# Patient Record
Sex: FEMALE | Race: WHITE | NOT HISPANIC OR LATINO | Employment: FULL TIME | URBAN - METROPOLITAN AREA
[De-identification: names, ages, dates, MRNs, and addresses within clinical notes are randomized per-mention and may not be internally consistent; named-entity substitution may affect disease eponyms.]

---
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
## 2025-01-30 ENCOUNTER — APPOINTMENT (OUTPATIENT)
Dept: ALLERGY | Facility: CLINIC | Age: 20
End: 2025-01-30
Payer: COMMERCIAL

## 2025-01-30 NOTE — PROGRESS NOTES
Subjective   Patient ID: Martha Mejia is a 19 y.o. female who presents for URI (X 2 weeks cough, congestion, low grade fever).    HPI     Here today for cough and congestion x 2 weeks -   Started as cough, productive of yellow sputum sputum   Got better for a few days then came back and got worse  Runny nose, sneezing   Sinus congestion gone now   Still coughing  No fever  No sore throat   She has tried dayquil   She has asthma and allergies - is not using her daily inhaler, using albuterol as needed - once daily   No wheezing   No chest pain or tightness  Some dyspnea with exertion     Current Outpatient Medications   Medication Sig Dispense Refill    albuterol (ProAir RespiClick) 90 mcg/actuation aerosol powdr breath activated inhaler Inhale 2 puffs every 4 hours if needed for wheezing. 1 each 3    albuterol 2.5 mg /3 mL (0.083 %) nebulizer solution INHALE CONTENT OF 1 VIAL UP TO 6 TIMES A DAY AS NEEDED FOR WHEEZING      budesonide (Pulmicort Flexhaler) 90 mcg/actuation inhaler Inhale 2 puffs 2 times a day.      dicyclomine (Bentyl) 20 mg tablet Take 1 tablet (20 mg) by mouth 4 times a day before meals.      ondansetron (Zofran) 8 mg tablet Take 1 tablet (8 mg) by mouth every 8 hours if needed.      raNITIdine (Zantac) 75 mg tablet Take 1 tablet (75 mg) by mouth if needed.      azithromycin (Zithromax) 250 mg tablet Take 2 tablets (500 mg) by mouth once daily for 1 day, THEN 1 tablet (250 mg) once daily for 4 days. Take 2 tabs (500 mg) by mouth today, than 1 daily for 4 days.. 6 tablet 0     No current facility-administered medications for this visit.       Review of Systems   Constitutional:  Negative for chills and fever.   HENT:  Positive for postnasal drip, rhinorrhea and sneezing. Negative for congestion, ear pain and sore throat.    Respiratory:  Positive for cough and shortness of breath. Negative for chest tightness and wheezing.    Cardiovascular:  Negative for chest pain.         Scales  reviewed    Patient Health Questionnaire-2 Score: 0 (01/31/25 1340)       Objective   BP 96/62 (BP Location: Right arm, Patient Position: Sitting, BP Cuff Size: Adult)   Pulse (!) 114   Temp 36.7 °C (98.1 °F) (Temporal)   Resp 14   Wt 50.3 kg (111 lb)   LMP 01/26/2025 (Exact Date)   SpO2 95%   BMI 20.30 kg/m²     Physical Exam    Constitutional: Well developed, well nourished, alert and in no acute distress.  Head and Face: NC/AT  Eyes: Normal external exam. Pupils equally round and reactive to light with normal accommodation and extraocular movements intact.  ENT: External inspection of ears normal, tympanic membranes visualized and normal. Nasal mucosa and turbinates swollen and erythematous, nasal discharge present. Oral mucosa moist, oropharynx clear without tonsillar exudate or erythema. +PND   Neck: Supple. No cervical lymphadenopathy   Cardiovascular: Regular rate and rhythm, normal S1 and S2, no murmurs, gallops, or rubs.   Pulmonary: No respiratory distress, lungs clear to auscultation bilaterally. No wheezes, rhonchi, rales.  Skin: Warm, well perfused, normal skin turgor and color.   Neurologic: Cranial nerves II-XII grossly intact.      Assessment/Plan     Problem List Items Addressed This Visit    None  Visit Diagnoses       Bronchitis    -  Primary    Relevant Medications    azithromycin (Zithromax) 250 mg tablet            Marifer Moe,   1/31/2025

## 2025-01-31 ENCOUNTER — OFFICE VISIT (OUTPATIENT)
Dept: PRIMARY CARE | Facility: CLINIC | Age: 20
End: 2025-01-31
Payer: COMMERCIAL

## 2025-01-31 VITALS
DIASTOLIC BLOOD PRESSURE: 62 MMHG | WEIGHT: 111 LBS | BODY MASS INDEX: 20.3 KG/M2 | RESPIRATION RATE: 14 BRPM | SYSTOLIC BLOOD PRESSURE: 96 MMHG | HEART RATE: 114 BPM | TEMPERATURE: 98.1 F | OXYGEN SATURATION: 95 %

## 2025-01-31 DIAGNOSIS — J40 BRONCHITIS: Primary | ICD-10-CM

## 2025-01-31 PROCEDURE — 99213 OFFICE O/P EST LOW 20 MIN: CPT | Performed by: FAMILY MEDICINE

## 2025-01-31 PROCEDURE — 1036F TOBACCO NON-USER: CPT | Performed by: FAMILY MEDICINE

## 2025-01-31 RX ORDER — AZITHROMYCIN 250 MG/1
TABLET, FILM COATED ORAL
Qty: 6 TABLET | Refills: 0 | Status: SHIPPED | OUTPATIENT
Start: 2025-01-31 | End: 2025-02-05

## 2025-01-31 ASSESSMENT — ENCOUNTER SYMPTOMS
COUGH: 1
SORE THROAT: 0
WHEEZING: 0
CHILLS: 0
FEVER: 0
CHEST TIGHTNESS: 0
SHORTNESS OF BREATH: 1
RHINORRHEA: 1

## 2025-01-31 ASSESSMENT — PATIENT HEALTH QUESTIONNAIRE - PHQ9
SUM OF ALL RESPONSES TO PHQ9 QUESTIONS 1 AND 2: 0
2. FEELING DOWN, DEPRESSED OR HOPELESS: NOT AT ALL
1. LITTLE INTEREST OR PLEASURE IN DOING THINGS: NOT AT ALL

## 2025-04-18 ENCOUNTER — OFFICE VISIT (OUTPATIENT)
Dept: PRIMARY CARE | Facility: CLINIC | Age: 20
End: 2025-04-18
Payer: COMMERCIAL

## 2025-04-18 ENCOUNTER — TELEPHONE (OUTPATIENT)
Dept: PRIMARY CARE | Facility: CLINIC | Age: 20
End: 2025-04-18

## 2025-04-18 ENCOUNTER — HOSPITAL ENCOUNTER (OUTPATIENT)
Dept: RADIOLOGY | Facility: CLINIC | Age: 20
Discharge: HOME | End: 2025-04-18
Payer: COMMERCIAL

## 2025-04-18 VITALS
TEMPERATURE: 98.8 F | BODY MASS INDEX: 20.05 KG/M2 | RESPIRATION RATE: 14 BRPM | SYSTOLIC BLOOD PRESSURE: 102 MMHG | DIASTOLIC BLOOD PRESSURE: 71 MMHG | OXYGEN SATURATION: 98 % | WEIGHT: 109.6 LBS | HEART RATE: 123 BPM

## 2025-04-18 DIAGNOSIS — R05.3 CHRONIC COUGH: Primary | ICD-10-CM

## 2025-04-18 DIAGNOSIS — G90.A POTS (POSTURAL ORTHOSTATIC TACHYCARDIA SYNDROME): ICD-10-CM

## 2025-04-18 DIAGNOSIS — R59.0 LYMPHADENOPATHY OF LEFT CERVICAL REGION: ICD-10-CM

## 2025-04-18 DIAGNOSIS — R00.0 TACHYCARDIA: ICD-10-CM

## 2025-04-18 DIAGNOSIS — R05.3 CHRONIC COUGH: ICD-10-CM

## 2025-04-18 DIAGNOSIS — R00.0 SINUS TACHYCARDIA: ICD-10-CM

## 2025-04-18 PROCEDURE — 1036F TOBACCO NON-USER: CPT | Performed by: FAMILY MEDICINE

## 2025-04-18 PROCEDURE — 99214 OFFICE O/P EST MOD 30 MIN: CPT | Performed by: FAMILY MEDICINE

## 2025-04-18 PROCEDURE — 93000 ELECTROCARDIOGRAM COMPLETE: CPT | Performed by: FAMILY MEDICINE

## 2025-04-18 PROCEDURE — 71046 X-RAY EXAM CHEST 2 VIEWS: CPT

## 2025-04-18 RX ORDER — BENZONATATE 100 MG/1
100 CAPSULE ORAL 3 TIMES DAILY PRN
Qty: 42 CAPSULE | Refills: 0 | Status: SHIPPED | OUTPATIENT
Start: 2025-04-18 | End: 2025-05-18

## 2025-04-18 ASSESSMENT — ENCOUNTER SYMPTOMS
FEVER: 0
PALPITATIONS: 0
RHINORRHEA: 0
FATIGUE: 1
SHORTNESS OF BREATH: 0
WHEEZING: 0
CHEST TIGHTNESS: 0
COUGH: 1
SORE THROAT: 1
CHILLS: 0

## 2025-04-18 NOTE — PROGRESS NOTES
Subjective   Patient ID: Martha Mejia is a 20 y.o. female who presents for URI (Productive cough since January, green/yellow mucous /bilat ear pain x 2 days ago, dizziness/left sided gland on neck swollen/Sore left side of mouth).    URI   Associated symptoms include congestion, coughing, ear pain and a sore throat. Pertinent negatives include no chest pain, rhinorrhea or wheezing.      I saw her in Jan for cough and congestion - was treated with azithromycin.    Got better.  Then got an ear infection in March and was on amoxicillin.  Took the course, helped, but symptoms quickly came back a few days later.   No fever now.    Mild nasal congestion since yesterday.   Neck glands feel swollen on left.    Hx mono twice as a child- last in middle school.    Mild sore throat.   Ears feel full.   Cough since end of Feb.      She has asthma and allergies - is using Advair daily only once a week, using albuterol as needed - few times per week. Follows with Dr. Solis. Hasn't used albuterol today.    Denies dyspnea or chest tightness or wheezing.  Takes zyrtec and flonase for allergies.    No heartburn.    She has POTS.      Dentist found a sore in her mouth today- thought might be yeast but decided to treat with a steroid rinse first.  She denies pain and can't feel it.  Left inner cheek.      Current Medications[1]    Review of Systems   Constitutional:  Positive for fatigue. Negative for chills and fever.   HENT:  Positive for congestion, ear pain, postnasal drip and sore throat. Negative for rhinorrhea.    Respiratory:  Positive for cough. Negative for chest tightness, shortness of breath and wheezing.    Cardiovascular:  Negative for chest pain and palpitations.       Objective   /71 (BP Location: Right arm, Patient Position: Sitting, BP Cuff Size: Adult)   Pulse (!) 123   Temp 37.1 °C (98.8 °F) (Temporal)   Resp 14   Wt 49.7 kg (109 lb 9.6 oz)   LMP 03/18/2025 (Exact Date)   SpO2 98%   BMI 20.05 kg/m²      Physical Exam    Constitutional: Well developed, well nourished, alert and in no acute distress.  Head and Face: NC/AT  Eyes: Normal external exam. Pupils equally round and reactive to light with normal accommodation and extraocular movements intact.  ENT: External inspection of ears normal, tympanic membranes visualized and normal. Nasal mucosa and turbinates normal, no nasal discharge present. Oral mucosa moist, oropharynx clear without exudate or erythema. Left buccal mucosa with erythematous whitish lesion.    Neck: Supple. No cervical lymphadenopathy   Cardiovascular: Regular rate and rhythm, normal S1 and S2, no murmurs, gallops, or rubs.   Pulmonary: No respiratory distress, lungs clear to auscultation bilaterally. No wheezes, rhonchi, rales.  Skin: Warm, well perfused, normal skin turgor and color.   Neurologic: Cranial nerves II-XII grossly intact.      Assessment/Plan     Problem List Items Addressed This Visit    None  Visit Diagnoses         Chronic cough    -  Primary    Relevant Medications    benzonatate (Tessalon) 100 mg capsule    Other Relevant Orders    XR chest 2 views    Bordetella Pertussis/Parapertussis PCR    Comprehensive Metabolic Panel    Referral to ENT    ERIC with Reflex to TIMOT[facility]      Tachycardia        Relevant Orders    ECG 12 lead (Clinic Performed) (Completed)      POTS (postural orthostatic tachycardia syndrome)          Lymphadenopathy of left cervical region        Relevant Orders    Prudence-Barr virus VCA antibody panel    Mononucleosis screen    CBC and Auto Differential    Referral to ENT    ERIC with Reflex to TIMOTHY      Sinus tachycardia        Relevant Orders    Referral to Cardiology          Labs and chest xray ordered   EKG today shows sinus tachycardia.  Refer cardiology.    Follow up with Dr. Solis  Refer ENT   Trial tessalon for cough, Delsym or Robitussin can also be used  Restart Advair once daily- rinse mouth after use.   If oral lesion doesn't resolve with  steroid rinse, follow up with dentist for further evaluation.      Marifer Moe DO  4/18/2025         [1]   Current Outpatient Medications   Medication Sig Dispense Refill    albuterol (ProAir RespiClick) 90 mcg/actuation aerosol powdr breath activated inhaler Inhale 2 puffs every 4 hours if needed for wheezing. 1 each 3    albuterol 2.5 mg /3 mL (0.083 %) nebulizer solution INHALE CONTENT OF 1 VIAL UP TO 6 TIMES A DAY AS NEEDED FOR WHEEZING      budesonide (Pulmicort Flexhaler) 90 mcg/actuation inhaler Inhale 2 puffs 2 times a day.      dicyclomine (Bentyl) 20 mg tablet Take 1 tablet (20 mg) by mouth 4 times a day before meals.      ondansetron (Zofran) 8 mg tablet Take 1 tablet (8 mg) by mouth every 8 hours if needed.      benzonatate (Tessalon) 100 mg capsule Take 1 capsule (100 mg) by mouth 3 times a day as needed for cough. Do not crush or chew. 42 capsule 0     No current facility-administered medications for this visit.

## 2025-04-18 NOTE — LETTER
April 18, 2025     Patient: Martha Mejia   YOB: 2005   Date of Visit: 4/18/2025       To Whom It May Concern:    Martha Mejia was seen in my clinic on 4/18/2025 at 11:30 am. Please excuse Martha for her absence from school on this day to make the appointment.    If you have any questions or concerns, please don't hesitate to call.         Sincerely,         Marifer Moe, DO Joan Lambert, PCNA

## 2025-04-18 NOTE — PATIENT INSTRUCTIONS
Labs and chest xray ordered   EKG   Follow up with Dr. Soils  Refer ENT   Trial tessalon for cough, Delsym or Robitussin can also be used  Restart Advair once daily- rinse mouth after use.   If oral lesion doesn't resolve with steroid rinse, follow up with dentist for further evaluation.

## 2025-04-18 NOTE — TELEPHONE ENCOUNTER
Patient called in and asked if she could get a doctor excuse for school.     Please and thank you.

## 2025-04-19 LAB
ALBUMIN SERPL-MCNC: 5.1 G/DL (ref 3.6–5.1)
ALP SERPL-CCNC: 35 U/L (ref 31–125)
ALT SERPL-CCNC: 11 U/L (ref 6–29)
ANA SER QL IF: NORMAL
ANION GAP SERPL CALCULATED.4IONS-SCNC: 9 MMOL/L (CALC) (ref 7–17)
AST SERPL-CCNC: 15 U/L (ref 10–30)
BASOPHILS # BLD AUTO: 50 CELLS/UL (ref 0–200)
BASOPHILS NFR BLD AUTO: 0.7 %
BILIRUB SERPL-MCNC: 2.6 MG/DL (ref 0.2–1.2)
BUN SERPL-MCNC: 12 MG/DL (ref 7–25)
CALCIUM SERPL-MCNC: 9.4 MG/DL (ref 8.6–10.2)
CHLORIDE SERPL-SCNC: 105 MMOL/L (ref 98–110)
CO2 SERPL-SCNC: 25 MMOL/L (ref 20–32)
CREAT SERPL-MCNC: 0.74 MG/DL (ref 0.5–0.96)
EBV NA IGG SER IA-ACNC: NORMAL [IU]/ML
EBV VCA IGG SER IA-ACNC: NORMAL
EBV VCA IGM SER IA-ACNC: NORMAL
EGFRCR SERPLBLD CKD-EPI 2021: 119 ML/MIN/1.73M2
EOSINOPHIL # BLD AUTO: 108 CELLS/UL (ref 15–500)
EOSINOPHIL NFR BLD AUTO: 1.5 %
ERYTHROCYTE [DISTWIDTH] IN BLOOD BY AUTOMATED COUNT: 11.8 % (ref 11–15)
GLUCOSE SERPL-MCNC: 81 MG/DL (ref 65–99)
HCT VFR BLD AUTO: 43.4 % (ref 35–45)
HETEROPH AB SER QL LA: NORMAL
HGB BLD-MCNC: 14.5 G/DL (ref 11.7–15.5)
LYMPHOCYTES # BLD AUTO: 1598 CELLS/UL (ref 850–3900)
LYMPHOCYTES NFR BLD AUTO: 22.2 %
MCH RBC QN AUTO: 31.4 PG (ref 27–33)
MCHC RBC AUTO-ENTMCNC: 33.4 G/DL (ref 32–36)
MCV RBC AUTO: 93.9 FL (ref 80–100)
MONOCYTES # BLD AUTO: 461 CELLS/UL (ref 200–950)
MONOCYTES NFR BLD AUTO: 6.4 %
NEUTROPHILS # BLD AUTO: 4982 CELLS/UL (ref 1500–7800)
NEUTROPHILS NFR BLD AUTO: 69.2 %
PLATELET # BLD AUTO: 219 THOUSAND/UL (ref 140–400)
PMV BLD REES-ECKER: 9.9 FL (ref 7.5–12.5)
POTASSIUM SERPL-SCNC: 3.9 MMOL/L (ref 3.5–5.3)
PROT SERPL-MCNC: 7.5 G/DL (ref 6.1–8.1)
QUEST EBV PANEL INTERPRETATION:: NORMAL
RBC # BLD AUTO: 4.62 MILLION/UL (ref 3.8–5.1)
SODIUM SERPL-SCNC: 139 MMOL/L (ref 135–146)
WBC # BLD AUTO: 7.2 THOUSAND/UL (ref 3.8–10.8)

## 2025-04-21 DIAGNOSIS — J40 BRONCHITIS: Primary | ICD-10-CM

## 2025-04-21 RX ORDER — PREDNISONE 10 MG/1
TABLET ORAL
Qty: 21 TABLET | Refills: 0 | Status: SHIPPED | OUTPATIENT
Start: 2025-04-21 | End: 2025-04-27

## 2025-04-21 NOTE — TELEPHONE ENCOUNTER
Pt mother called in wanting to know if pt could receive a round of steroids for the chronic cough pt has been dealing with states the oral medication prescribed has not helped and also wanted to know if the doctors excuse note could be sent over in MyChart pt states she does not see the letter written

## 2025-04-22 DIAGNOSIS — R59.0 LYMPHADENOPATHY OF LEFT CERVICAL REGION: ICD-10-CM

## 2025-04-22 DIAGNOSIS — R76.8 POSITIVE ANA (ANTINUCLEAR ANTIBODY): Primary | ICD-10-CM

## 2025-04-22 DIAGNOSIS — R05.3 CHRONIC COUGH: ICD-10-CM

## 2025-04-22 LAB
ALBUMIN SERPL-MCNC: 5.1 G/DL (ref 3.6–5.1)
ALP SERPL-CCNC: 35 U/L (ref 31–125)
ALT SERPL-CCNC: 11 U/L (ref 6–29)
ANA PAT SER IF-IMP: ABNORMAL
ANA SER QL IF: POSITIVE
ANA TITR SER IF: ABNORMAL TITER
ANION GAP SERPL CALCULATED.4IONS-SCNC: 9 MMOL/L (CALC) (ref 7–17)
AST SERPL-CCNC: 15 U/L (ref 10–30)
B PARAPERT DNA SPEC QL NAA+PROBE: NOT DETECTED
B PERT DNA SPEC QL NAA+PROBE: NOT DETECTED
BASOPHILS # BLD AUTO: 50 CELLS/UL (ref 0–200)
BASOPHILS NFR BLD AUTO: 0.7 %
BILIRUB SERPL-MCNC: 2.6 MG/DL (ref 0.2–1.2)
BUN SERPL-MCNC: 12 MG/DL (ref 7–25)
CALCIUM SERPL-MCNC: 9.4 MG/DL (ref 8.6–10.2)
CENTROMERE B AB SER-ACNC: ABNORMAL AI
CHLORIDE SERPL-SCNC: 105 MMOL/L (ref 98–110)
CO2 SERPL-SCNC: 25 MMOL/L (ref 20–32)
CREAT SERPL-MCNC: 0.74 MG/DL (ref 0.5–0.96)
DSDNA AB SER-ACNC: <1 IU/ML
EBV NA IGG SER IA-ACNC: >600 U/ML
EBV VCA IGG SER IA-ACNC: 159 U/ML
EBV VCA IGM SER IA-ACNC: <36 U/ML
EGFRCR SERPLBLD CKD-EPI 2021: 119 ML/MIN/1.73M2
ENA JO1 AB SER IA-ACNC: ABNORMAL AI
ENA RNP AB SER-ACNC: ABNORMAL AI
ENA SCL70 AB SER IA-ACNC: ABNORMAL AI
ENA SM AB SER IA-ACNC: ABNORMAL AI
ENA SM+RNP AB SER IA-ACNC: ABNORMAL AI
ENA SS-A AB SER IA-ACNC: ABNORMAL AI
ENA SS-B AB SER IA-ACNC: ABNORMAL AI
EOSINOPHIL # BLD AUTO: 108 CELLS/UL (ref 15–500)
EOSINOPHIL NFR BLD AUTO: 1.5 %
ERYTHROCYTE [DISTWIDTH] IN BLOOD BY AUTOMATED COUNT: 11.8 % (ref 11–15)
GLUCOSE SERPL-MCNC: 81 MG/DL (ref 65–99)
HCT VFR BLD AUTO: 43.4 % (ref 35–45)
HETEROPH AB SER QL LA: NEGATIVE
HGB BLD-MCNC: 14.5 G/DL (ref 11.7–15.5)
LABORATORY COMMENT REPORT: ABNORMAL
LYMPHOCYTES # BLD AUTO: 1598 CELLS/UL (ref 850–3900)
LYMPHOCYTES NFR BLD AUTO: 22.2 %
MCH RBC QN AUTO: 31.4 PG (ref 27–33)
MCHC RBC AUTO-ENTMCNC: 33.4 G/DL (ref 32–36)
MCV RBC AUTO: 93.9 FL (ref 80–100)
MONOCYTES # BLD AUTO: 461 CELLS/UL (ref 200–950)
MONOCYTES NFR BLD AUTO: 6.4 %
NEUTROPHILS # BLD AUTO: 4982 CELLS/UL (ref 1500–7800)
NEUTROPHILS NFR BLD AUTO: 69.2 %
NUCLEOSOME AB SER IA-ACNC: ABNORMAL AI
PLATELET # BLD AUTO: 219 THOUSAND/UL (ref 140–400)
PMV BLD REES-ECKER: 9.9 FL (ref 7.5–12.5)
POTASSIUM SERPL-SCNC: 3.9 MMOL/L (ref 3.5–5.3)
PROT SERPL-MCNC: 7.5 G/DL (ref 6.1–8.1)
QUEST EBV PANEL INTERPRETATION:: ABNORMAL
RBC # BLD AUTO: 4.62 MILLION/UL (ref 3.8–5.1)
RIBOSOMAL P AB SER-ACNC: ABNORMAL AI
SODIUM SERPL-SCNC: 139 MMOL/L (ref 135–146)
SPECIMEN SOURCE: NORMAL
WBC # BLD AUTO: 7.2 THOUSAND/UL (ref 3.8–10.8)

## 2025-04-25 NOTE — TELEPHONE ENCOUNTER
Patient mother called and stated that daughter was in the er this past week stated the  had already spoken to her pcp and stated pcp said to get the daughter into the office. Daughter cannot come in until the end of may, did offer another provider due to when the mother wanted daughter to come in there were no openings with her pcp she refused to see anyone else please advise.

## 2025-04-29 NOTE — TELEPHONE ENCOUNTER
Please call the patient's Mother, I could see Martha on May 19th or 20th,  or on Friday June 6th?  Ok to take an Urgent/Acute slot,  I will not be in the office between these dates

## 2025-05-09 PROBLEM — R55 PRE-SYNCOPE: Status: ACTIVE | Noted: 2025-05-09

## 2025-05-09 PROBLEM — G43.909 MIGRAINE HEADACHE: Status: ACTIVE | Noted: 2025-05-09

## 2025-05-09 PROBLEM — J45.40 MODERATE PERSISTENT ASTHMA WITHOUT COMPLICATION (HHS-HCC): Status: ACTIVE | Noted: 2025-05-09

## 2025-05-09 PROBLEM — R53.83 FATIGUE: Status: ACTIVE | Noted: 2025-05-09

## 2025-05-09 PROBLEM — R82.71 BACTERIURIA WITH PYURIA: Status: RESOLVED | Noted: 2023-10-12 | Resolved: 2025-05-09

## 2025-05-09 PROBLEM — R10.9 ABDOMINAL PAIN: Status: RESOLVED | Noted: 2023-10-12 | Resolved: 2025-05-09

## 2025-05-09 PROBLEM — N94.6 DYSMENORRHEA: Status: ACTIVE | Noted: 2025-05-09

## 2025-05-09 PROBLEM — L73.9 FOLLICULITIS: Status: RESOLVED | Noted: 2024-08-08 | Resolved: 2025-05-09

## 2025-05-09 PROBLEM — R82.81 BACTERIURIA WITH PYURIA: Status: RESOLVED | Noted: 2023-10-12 | Resolved: 2025-05-09

## 2025-05-09 PROBLEM — R23.1 PALE SKIN: Status: ACTIVE | Noted: 2025-05-09

## 2025-05-12 DIAGNOSIS — J30.9 ALLERGIC RHINITIS, UNSPECIFIED SEASONALITY, UNSPECIFIED TRIGGER: Primary | ICD-10-CM

## 2025-05-12 RX ORDER — DEXTROMETHORPHAN HYDROBROMIDE, GUAIFENESIN AND PSEUDOEPHEDRINE HYDROCHLORIDE 15; 400; 60 MG/1; MG/1; MG/1
1 TABLET ORAL EVERY 8 HOURS PRN
Qty: 21 TABLET | Refills: 0 | Status: SHIPPED | OUTPATIENT
Start: 2025-05-12

## 2025-05-30 ENCOUNTER — APPOINTMENT (OUTPATIENT)
Dept: CARDIOLOGY | Facility: HOSPITAL | Age: 20
End: 2025-05-30
Payer: COMMERCIAL

## 2025-05-30 ENCOUNTER — OFFICE VISIT (OUTPATIENT)
Dept: CARDIOLOGY | Facility: CLINIC | Age: 20
End: 2025-05-30
Payer: COMMERCIAL

## 2025-05-30 ENCOUNTER — HOSPITAL ENCOUNTER (OUTPATIENT)
Dept: CARDIOLOGY | Facility: CLINIC | Age: 20
Discharge: HOME | End: 2025-05-30
Payer: COMMERCIAL

## 2025-05-30 VITALS
HEART RATE: 103 BPM | SYSTOLIC BLOOD PRESSURE: 100 MMHG | OXYGEN SATURATION: 98 % | WEIGHT: 114 LBS | BODY MASS INDEX: 20.98 KG/M2 | HEIGHT: 62 IN | DIASTOLIC BLOOD PRESSURE: 62 MMHG

## 2025-05-30 DIAGNOSIS — R00.0 SINUS TACHYCARDIA: ICD-10-CM

## 2025-05-30 DIAGNOSIS — R55 PRE-SYNCOPE: ICD-10-CM

## 2025-05-30 DIAGNOSIS — R53.83 OTHER FATIGUE: ICD-10-CM

## 2025-05-30 DIAGNOSIS — R00.2 PALPITATIONS: ICD-10-CM

## 2025-05-30 DIAGNOSIS — R00.0 SINUS TACHYCARDIA: Primary | ICD-10-CM

## 2025-05-30 LAB — BODY SURFACE AREA: 1.5 M2

## 2025-05-30 PROCEDURE — 93005 ELECTROCARDIOGRAM TRACING: CPT | Performed by: STUDENT IN AN ORGANIZED HEALTH CARE EDUCATION/TRAINING PROGRAM

## 2025-05-30 PROCEDURE — 99214 OFFICE O/P EST MOD 30 MIN: CPT | Performed by: STUDENT IN AN ORGANIZED HEALTH CARE EDUCATION/TRAINING PROGRAM

## 2025-05-30 PROCEDURE — 1036F TOBACCO NON-USER: CPT | Performed by: STUDENT IN AN ORGANIZED HEALTH CARE EDUCATION/TRAINING PROGRAM

## 2025-05-30 PROCEDURE — 93225 XTRNL ECG REC<48 HRS REC: CPT

## 2025-05-30 PROCEDURE — 99204 OFFICE O/P NEW MOD 45 MIN: CPT | Performed by: STUDENT IN AN ORGANIZED HEALTH CARE EDUCATION/TRAINING PROGRAM

## 2025-05-30 PROCEDURE — 3008F BODY MASS INDEX DOCD: CPT | Performed by: STUDENT IN AN ORGANIZED HEALTH CARE EDUCATION/TRAINING PROGRAM

## 2025-05-30 ASSESSMENT — ENCOUNTER SYMPTOMS
OCCASIONAL FEELINGS OF UNSTEADINESS: 0
DEPRESSION: 0
LOSS OF SENSATION IN FEET: 0

## 2025-05-30 NOTE — PROGRESS NOTES
Referred by Dr. Moe for New Patient Visit (Patient is here for a new patient consult. )     History Of Present Illness:    Martha Mejia is a 20 y.o. female past history notable for asthma as well as neurogenic syncope/vasovagal syncope who presents to establish care with us.  Patient is followed with peds cards in the past and now is here to establish care with us.  Patient has chronic tachycardia with elevated resting heart rates that are hovering around 100.  She has had progressive symptoms that are similar to POTS.  Notably her older sister has POTS and follows in our clinic.  Patient aggressively maintains hydration drinking up to 100 ounces of fluid as well as using electrolyte water as well as salt tabs.  She has had near syncope when she stands up really fast.  There is no reports of heavy ethanol consumption.  She does drink energy drinks from time to time but not daily.  She does enjoy coffee as well.  Patient is also noticed fatigue during this timeframe.  Towards beginning of this year she has been dealing with acute episode of persistent bronchitis and cough.  She has had several rounds of treatment and the going thought now is that the mold exposure may be a trigger for underlying cough.  During this time she is had increased heart rate and increased symptoms.    In 2023 patient wore a Holter monitor which did not show evidence of tacky bradycardia syndrome atrial fibrillation or VT average heart rate of 94 bpm and less than 1% of ectopic rhythm.  Echo performed at the time showed normal ejection fraction no significant valve pathology or congenital abnormality.  Previous labs reviewed.  Baseline ECG today is sinus rhythm with a heart rate of 92.      Past Medical History:  She has a past medical history of Cough, unspecified (03/13/2015), Encounter for immunization (01/25/2019), Encounter for screening for disorder due to exposure to contaminants, Other specified abnormal findings of blood  "chemistry (09/13/2019), Pain in right arm (09/13/2019), Personal history of other diseases of the female genital tract (12/02/2021), Personal history of other diseases of the female genital tract (12/19/2019), Personal history of other specified conditions (06/11/2019), Personal history of other specified conditions (08/09/2019), Plantar wart (02/16/2018), and Right lower quadrant abdominal tenderness (05/13/2019).    Past Surgical History:  She has a past surgical history that includes Tonsillectomy (02/12/2014) and Other surgical history (02/12/2014).      Social History:  She reports that she has never smoked. She has never been exposed to tobacco smoke. She has never used smokeless tobacco. She reports that she does not drink alcohol and does not use drugs.    Family History:  Family History[1]     Allergies:  Gluten, Milk, Lactase, Milk containing products (dairy), and Other    Outpatient Medications:  Current Outpatient Medications   Medication Instructions    albuterol (ProAir RespiClick) 90 mcg/actuation aerosol powdr breath activated inhaler 2 puffs, inhalation, Every 4 hours PRN    albuterol 2.5 mg /3 mL (0.083 %) nebulizer solution INHALE CONTENT OF 1 VIAL UP TO 6 TIMES A DAY AS NEEDED FOR WHEEZING    budesonide (Pulmicort Flexhaler) 90 mcg/actuation inhaler 2 puffs, 2 times daily RT    dicyclomine (BENTYL) 20 mg, 4 times daily before meals and nightly    ondansetron (ZOFRAN) 8 mg, Every 8 hours PRN    pseudoephedrine-DM-guaifenesin (Capmist DM) 60- mg tablet 1 tablet, oral, Every 8 hours PRN        Last Recorded Vitals:  Vitals:    05/30/25 0805   BP: 100/62   BP Location: Left arm   Patient Position: Sitting   Pulse: 103   SpO2: 98%   Weight: 51.7 kg (114 lb)   Height: 1.575 m (5' 2\")       Physical Exam:  General: No acute distress,  A&O x3, mother is present  Skin: Warm and dry  Neck: JVD is not elevated  ENT: Moist mucous membranes no lesions appreciated  Pulmonary: CTAB  Cards: Tachycardic but " "otherwise regular rate rhythm, no murmurs gallops or rubs appreciated normal S1-S2  Psych: Appropriate mood and affect     @PESEC->PESEC(CIRCULAR REFERENCE)@       Last Labs:  CBC -  Lab Results   Component Value Date    WBC 7.2 04/18/2025    HGB 14.5 04/18/2025    HCT 43.4 04/18/2025    MCV 93.9 04/18/2025     04/18/2025       CMP -  Lab Results   Component Value Date    CALCIUM 9.4 04/18/2025    PROT 7.5 04/18/2025    ALBUMIN 5.1 04/18/2025    AST 15 04/18/2025    ALT 11 04/18/2025    ALKPHOS 35 04/18/2025    BILITOT 2.6 (H) 04/18/2025       LIPID PANEL -   No results found for: \"CHOL\", \"TRIG\", \"HDL\", \"CHHDL\", \"LDLF\", \"VLDL\", \"NHDL\"    RENAL FUNCTION PANEL -   Lab Results   Component Value Date    GLUCOSE 81 04/18/2025     04/18/2025    K 3.9 04/18/2025     04/18/2025    CO2 25 04/18/2025    ANIONGAP 9 04/18/2025    BUN 12 04/18/2025    CREATININE 0.74 04/18/2025    CALCIUM 9.4 04/18/2025    ALBUMIN 5.1 04/18/2025        No results found for: \"BNP\", \"HGBA1C\"    Last Cardiology Tests:  ECG:  ECG 12 lead (Clinic Performed) 04/18/2025    Assessment/Plan     1.  Inappropriate sinus tachycardia versus POTS: Baseline symptoms are highly suspicious for POTS.  Suspect resting heart rate is manifestation of this phenomenon.  Previous workup including Holter monitor and echocardiogram are fairly benign.  Resting heart rate is 94 bpm.  Baseline ECG today of normal sinus rhythm with a heart rate of 92 bpm.  - Patient is dealing with chronic cough/bronchitis since the beginning of the year and has undergone several rounds of treatment.  Recent labs notable for positive ERIC  - We have ordered autonomic testing  - 48-hour Holter monitor to again reassess average heart rate  -Check iron and thyroid studies  - The patient may return to clinic once testing is completed    (This note was generated with voice recognition software and may contain errors including spelling, grammar, syntax and missed recognition of " what was dictated, of which may not have been fully corrected)     Juan R Estrella MD PhD       [1]   Family History  Problem Relation Name Age of Onset    Asthma Mother      Urticaria Mother      Allergies Mother      Chronic bronchitis Mother      Asthma Father      Eczema Father      Allergies Father      Asthma Sister      Eczema Sister      Allergies Sister      Allergic rhinitis Daughter

## 2025-05-31 LAB
FERRITIN SERPL-MCNC: 27 NG/ML (ref 16–154)
IRON SATN MFR SERPL: 25 % (CALC) (ref 16–45)
IRON SERPL-MCNC: 80 MCG/DL (ref 40–190)
TIBC SERPL-MCNC: 318 MCG/DL (CALC) (ref 250–450)
TSH SERPL-ACNC: 1.36 MIU/L

## 2025-06-02 PROBLEM — J00 ACUTE RHINITIS: Status: ACTIVE | Noted: 2023-08-21

## 2025-06-02 NOTE — PROGRESS NOTES
"  Subjective   Patient ID:   [id number]   Martha Mejia is a 20 y.o. female who presents for Follow-up (Frequent illness. Cough and fatigue.  ).    Chief Complaint   Patient presents with    Follow-up     Frequent illness. Cough and fatigue.        Patient presents for 6 month asthma F/U.  Patient  is accompanied by her mother.    Since last visit, 8-8-24, patient states her EBV panel and ERIC 4-18-25 were positive.    Patient states she had bronchitis while home for [employer].  Although she was starting to get better, she soon worsened upon returning to her dorm.  She is coughing up thick mucous, which worsens at times with excess talking.  She is also having congestion and vocal hoarseness.  She has a tickle at the base of her throat.  She also tried an oral steroid for her cough without a change in her symptoms. She had mono recently, but did not know it.  She had sporadic fevers and was on antibiotics at least 2 times.     This is the second year in this dorm, but she will not be going back.  She is going to [employer] for next semester and then moving into an apartment.     Review of Systems   HENT:  Positive for congestion and voice change (hoarseness).    Respiratory:  Positive for cough (productive of thick mucous).      Objective   Ht 1.575 m (5' 2\")   Wt 51.7 kg (114 lb)   BMI 20.85 kg/m²      Physical Exam  Constitutional:       Appearance: Normal appearance.   HENT:      Head: Normocephalic and atraumatic.      Right Ear: External ear normal. There is no impacted cerumen.      Left Ear: External ear normal. There is no impacted cerumen.      Nose: No congestion or rhinorrhea.   Eyes:      Extraocular Movements: Extraocular movements intact.      Conjunctiva/sclera: Conjunctivae normal.      Pupils: Pupils are equal, round, and reactive to light.   Cardiovascular:      Rate and Rhythm: Normal rate and regular rhythm.      Heart sounds: No murmur heard.     No friction rub. No gallop.   Pulmonary:      " Effort: No respiratory distress.      Breath sounds: No wheezing, rhonchi or rales.   Skin:     General: Skin is warm and dry.   Neurological:      Mental Status: She is alert.   Psychiatric:         Mood and Affect: Mood normal.         Behavior: Behavior normal.     Pulmonary Functions Testing Results:    FEV1   Date Value Ref Range Status   06/11/2025 97 liters Final     FVC   Date Value Ref Range Status   06/11/2025 92 liters Final     FEV1/FVC   Date Value Ref Range Status   06/11/2025 107 % Final     Assessment/Plan   Diagnoses and all orders for this visit:  Chronic infection  -     Lymphocyte Proliferation to Antigens; Future  -     Lymphocyte Proliferation to Mitogens; Future  -     Isma Binding Lectin; Future  -     QUEST MISCELLANEOUS TEST (ROOM TEMPERATURE); Future  -     Strep Pneumo IgG Ab 23 Serotypes; Future  -     Tetanus Antibody, IgG; Future  -     Immunodeficiency Profile Panel; Future  -     Immunoglobulins (IgG, IgA, IgM); Future  -     Diphtheria Antibody; Future  -     Respiratory Culture/Smear; Future  Other cough  -     Referral to ENT; Future  -     Respiratory Culture/Smear; Future  Moderate persistent asthma without complication ([facility])    Mari Solis MD

## 2025-06-02 NOTE — PATIENT INSTRUCTIONS
Sputum culture    Referral to Paradise Delatorre MD, CAREN Maxwell MD or Edu Wright, NP    Recheck immune system

## 2025-06-03 ENCOUNTER — TELEPHONE (OUTPATIENT)
Dept: CARDIOLOGY | Facility: CLINIC | Age: 20
End: 2025-06-03
Payer: COMMERCIAL

## 2025-06-03 NOTE — TELEPHONE ENCOUNTER
----- Message from Juan R Estrella sent at 6/2/2025  4:06 PM EDT -----  Notify patient of stable lab test results.     ----- Message -----  From: Margo Celiro Results In  Sent: 5/31/2025  12:20 AM EDT  To: Juan R Estrella MD PhD

## 2025-06-05 ENCOUNTER — APPOINTMENT (OUTPATIENT)
Dept: ALLERGY | Facility: CLINIC | Age: 20
End: 2025-06-05
Payer: COMMERCIAL

## 2025-06-05 VITALS — BODY MASS INDEX: 20.98 KG/M2 | HEIGHT: 62 IN | WEIGHT: 114 LBS

## 2025-06-05 DIAGNOSIS — J45.40 MODERATE PERSISTENT ASTHMA WITHOUT COMPLICATION (HHS-HCC): ICD-10-CM

## 2025-06-05 DIAGNOSIS — R05.8 OTHER COUGH: ICD-10-CM

## 2025-06-05 DIAGNOSIS — B99.9 CHRONIC INFECTION: Primary | ICD-10-CM

## 2025-06-05 PROCEDURE — 3008F BODY MASS INDEX DOCD: CPT | Performed by: ALLERGY & IMMUNOLOGY

## 2025-06-05 PROCEDURE — 94375 RESPIRATORY FLOW VOLUME LOOP: CPT | Performed by: ALLERGY & IMMUNOLOGY

## 2025-06-05 PROCEDURE — 99214 OFFICE O/P EST MOD 30 MIN: CPT | Performed by: ALLERGY & IMMUNOLOGY

## 2025-06-05 PROCEDURE — 96160 PT-FOCUSED HLTH RISK ASSMT: CPT | Performed by: ALLERGY & IMMUNOLOGY

## 2025-06-06 LAB
ATRIAL RATE: 92 BPM
P AXIS: 63 DEGREES
P OFFSET: 185 MS
P ONSET: 138 MS
PR INTERVAL: 158 MS
Q ONSET: 217 MS
QRS COUNT: 15 BEATS
QRS DURATION: 90 MS
QT INTERVAL: 334 MS
QTC CALCULATION(BAZETT): 413 MS
QTC FREDERICIA: 385 MS
R AXIS: 77 DEGREES
T AXIS: 30 DEGREES
T OFFSET: 384 MS
VENTRICULAR RATE: 92 BPM

## 2025-06-08 LAB — BODY SURFACE AREA: 1.5 M2

## 2025-06-11 PROBLEM — R05.9 COUGH: Status: ACTIVE | Noted: 2025-06-11

## 2025-06-11 PROBLEM — B99.9 CHRONIC INFECTION: Status: ACTIVE | Noted: 2025-06-11

## 2025-06-11 PROBLEM — J45.40 MODERATE PERSISTENT ASTHMA WITHOUT COMPLICATION (HHS-HCC): Status: ACTIVE | Noted: 2023-08-21

## 2025-06-11 LAB
FEV1/FVC: 107 %
FEV1: 97 LITERS
FVC: 92 LITERS

## 2025-06-11 ASSESSMENT — ENCOUNTER SYMPTOMS
VOICE CHANGE: 1
COUGH: 1

## 2025-06-11 NOTE — ASSESSMENT & PLAN NOTE
Patient developed bronchitis while at home for [employer] break and started coughing upon returning to her dorm.  She had mono recently and sporadic fevers requiring at least 2 courses of antibiotics.    Recheck immune system.

## 2025-06-11 NOTE — ASSESSMENT & PLAN NOTE
She C/O a tickle cough productive of thick sputum, vocal hoarseness and congestion after returning to her dorm.      She will provide a sputum culture.    Referral to Paradise Delatorre MD, CAREN Maxwell MD or Edu Wright NP

## 2025-06-11 NOTE — ASSESSMENT & PLAN NOTE
She is having some exacerbation due to her recurrent illness. Nonetheless prednisone is not helping her cough.    PFT looks good except jagged loop. Prednisone did not help cough.

## 2025-06-16 ENCOUNTER — LAB (OUTPATIENT)
Dept: LAB | Facility: HOSPITAL | Age: 20
End: 2025-06-16
Payer: COMMERCIAL

## 2025-06-16 LAB
BASOPHILS # BLD AUTO: 0.06 X10*3/UL (ref 0–0.1)
BASOPHILS NFR BLD AUTO: 1.3 %
EOSINOPHIL # BLD AUTO: 0.13 X10*3/UL (ref 0–0.7)
EOSINOPHIL NFR BLD AUTO: 2.7 %
ERYTHROCYTE [DISTWIDTH] IN BLOOD BY AUTOMATED COUNT: 12.3 % (ref 11.5–14.5)
HCT VFR BLD AUTO: 42.5 % (ref 36–46)
HGB BLD-MCNC: 13.8 G/DL (ref 12–16)
IMM GRANULOCYTES # BLD AUTO: 0.02 X10*3/UL (ref 0–0.7)
IMM GRANULOCYTES NFR BLD AUTO: 0.4 % (ref 0–0.9)
LYMPHOCYTES # BLD AUTO: 1.7 X10*3/UL (ref 1.2–4.8)
LYMPHOCYTES NFR BLD AUTO: 35.9 %
MCH RBC QN AUTO: 31.4 PG (ref 26–34)
MCHC RBC AUTO-ENTMCNC: 32.5 G/DL (ref 32–36)
MCV RBC AUTO: 97 FL (ref 80–100)
MONOCYTES # BLD AUTO: 0.23 X10*3/UL (ref 0.1–1)
MONOCYTES NFR BLD AUTO: 4.9 %
NEUTROPHILS # BLD AUTO: 2.6 X10*3/UL (ref 1.2–7.7)
NEUTROPHILS NFR BLD AUTO: 54.8 %
NRBC BLD-RTO: 0 /100 WBCS (ref 0–0)
PLATELET # BLD AUTO: 214 X10*3/UL (ref 150–450)
RBC # BLD AUTO: 4.39 X10*6/UL (ref 4–5.2)
WBC # BLD AUTO: 4.7 X10*3/UL (ref 4.4–11.3)

## 2025-06-16 PROCEDURE — 85025 COMPLETE CBC W/AUTO DIFF WBC: CPT

## 2025-06-16 PROCEDURE — 88185 FLOWCYTOMETRY/TC ADD-ON: CPT

## 2025-06-16 PROCEDURE — 86353 LYMPHOCYTE TRANSFORMATION: CPT

## 2025-06-16 PROCEDURE — 88184 FLOWCYTOMETRY/ TC 1 MARKER: CPT

## 2025-06-19 LAB
C DIPHTHERIAE AB SER IA-ACNC: 0.74 IU/ML
C TETANI TOXOID AB SER-ACNC: 0.84 IU/ML
CD19 CELLS # BLD: 0.22 X10E9/L (ref 0.07–0.91)
CD19 CELLS NFR BLD: 13 % (ref 6–19)
CD3 CELLS # BLD: 1.38 X10E9/L (ref 0.71–4.18)
CD3 CELLS NFR SPEC: 81 % (ref 59–87)
CD3+CD4+ CELLS # BLD: 0.97 X10E9/L (ref 0.35–2.74)
CD3+CD4+ CELLS # BLD: 969 /MM3 (ref 350–2740)
CD3+CD4+ CELLS NFR BLD: 57 % (ref 29–57)
CD3+CD4+ CELLS/CD3+CD8+ CLL BLD: 2.59 % (ref 1–3.5)
CD3+CD4-CD8-CD45+ CELLS NFR BLD: 3 % (ref 0–6)
CD3+CD8+ CELLS # BLD: 0.37 X10E9/L (ref 0.08–1.49)
CD3+CD8+ CELLS NFR BLD: 22 % (ref 7–31)
CD3-CD16+CD56+ CELLS # BLD: 0.1 X10E9/L (ref 0–0.86)
CD3-CD16+CD56+ CELLS NFR BLD: 6 % (ref 0–18)
FLOW CYTOMETRY SPECIALIST REVIEW: NORMAL
IGA SERPL-MCNC: 80 MG/DL (ref 47–310)
IGG SERPL-MCNC: 955 MG/DL (ref 600–1640)
IGM SERPL-MCNC: 103 MG/DL (ref 50–300)
LYMPHOCYTES # SPEC AUTO: 1.7 X10*3/UL
PATH REVIEW, IMMUNODEFICIENCY PROFILE: NORMAL
QUEST FLEXITEST1 RESULTS:: NORMAL
QUEST MANNAN BINDING LECTIN: NORMAL
S PN DA SERO 19F IGG SER-MCNC: 2.2 UG/ML
S PNEUM DA 1 IGG SER-MCNC: 0.5 UG/ML
S PNEUM DA 10A IGG SER-MCNC: <0.3 UG/ML
S PNEUM DA 11A IGG SER-MCNC: <0.3 UG/ML
S PNEUM DA 12F IGG SER-MCNC: <0.3 UG/ML
S PNEUM DA 14 IGG SER-MCNC: 14.8
S PNEUM DA 15B IGG SER-MCNC: 2.4 UG/ML
S PNEUM DA 17F IGG SER-MCNC: 1 UG/ML
S PNEUM DA 18C IGG SER-MCNC: 1.7
S PNEUM DA 19A IGG SER-MCNC: <0.3 UG/ML
S PNEUM DA 2 IGG SER-MCNC: <0.3 UG/ML
S PNEUM DA 20A IGG SER-MCNC: 0.5 UG/ML
S PNEUM DA 22F IGG SER-MCNC: 0.4 UG/ML
S PNEUM DA 23F IGG SER-MCNC: 2.6 UG/ML
S PNEUM DA 3 IGG SER-MCNC: <0.3 UG/ML
S PNEUM DA 33F IGG SER-MCNC: <0.3 UG/ML
S PNEUM DA 4 IGG SER-MCNC: 0.7 UG/ML
S PNEUM DA 5 IGG SER-MCNC: 1.3 UG/ML
S PNEUM DA 6B IGG SER-MCNC: 6.7 UG/ML
S PNEUM DA 7F IGG SER-MCNC: <0.3 UG/ML
S PNEUM DA 8 IGG SER-MCNC: 1.2 UG/ML
S PNEUM DA 9N IGG SER-MCNC: 0.6 UG/ML
S PNEUM DA 9V IGG SER-MCNC: 1.3 UG/ML

## 2025-06-20 ENCOUNTER — APPOINTMENT (OUTPATIENT)
Dept: PRIMARY CARE | Facility: CLINIC | Age: 20
End: 2025-06-20
Payer: COMMERCIAL

## 2025-06-20 VITALS
TEMPERATURE: 97 F | BODY MASS INDEX: 20.89 KG/M2 | DIASTOLIC BLOOD PRESSURE: 59 MMHG | SYSTOLIC BLOOD PRESSURE: 92 MMHG | HEART RATE: 94 BPM | WEIGHT: 114.2 LBS | OXYGEN SATURATION: 97 %

## 2025-06-20 DIAGNOSIS — R76.8 ELEVATED ANTINUCLEAR ANTIBODY (ANA) LEVEL: ICD-10-CM

## 2025-06-20 DIAGNOSIS — Z00.00 ENCOUNTER FOR WELLNESS EXAMINATION IN ADULT: Primary | ICD-10-CM

## 2025-06-20 DIAGNOSIS — D80.2 IGA DEFICIENCY, SELECTIVE (MULTI): ICD-10-CM

## 2025-06-20 DIAGNOSIS — G90.A POTS (POSTURAL ORTHOSTATIC TACHYCARDIA SYNDROME): ICD-10-CM

## 2025-06-20 PROCEDURE — 1036F TOBACCO NON-USER: CPT | Performed by: FAMILY MEDICINE

## 2025-06-20 PROCEDURE — 99395 PREV VISIT EST AGE 18-39: CPT | Performed by: FAMILY MEDICINE

## 2025-06-20 NOTE — PROGRESS NOTES
"Subjective   Patient ID: Martha Mejia is a 20 y.o. female who presents for Annual Exam (States has been sick since January bad cough coughing up \"yellow stuff\" and was exposed to mold in her dorm and states since she has been home has seen some improvement. Has been dealing with on and off fevers 99 to 102, 103 and has had sinus issues, double ear infections and hasn't had any ear problems since college. Has some test results to discuss).  History of Present Illness  Martha Mejia is a 20 year old female who presents with persistent cough and fatigue.    She has been experiencing a persistent cough that has improved by about 50% since returning home from college a month ago. Initially constant, the cough is now sporadic, with some days free of coughing and others marked by difficult-to-control coughing jags. Despite coughing up yellow and green mucus, she has been unable to produce enough sputum for a culture. She has been on multiple rounds of antibiotics in the past, which provided only temporary relief. She has not been using an albuterol inhaler daily as her lungs are reportedly clear.    She has experienced low-grade fevers since returning home, with temperatures ranging from 99 to just over 100 degrees Fahrenheit. These fevers are less consistent than before. She reports a significant reduction in sinus congestion and pressure since leaving her college dorm, which was an older building.    She experiences episodes of rapid heartbeat, often indistinguishable from her normal heart rate due to its consistently fast pace. She wore a heart monitor for two days, and a tilt table test is scheduled for August 5th to evaluate for POTS. Her heart rate increases significantly with mild exercise, such as climbing stairs, reaching up to 172 BPM. She experiences shortness of breath and chest discomfort during these episodes, which she attributes to asthma.    She has a positive antinuclear antibody test with a nuclear " speckled pattern, which can be associated with conditions like Sjogren's syndrome. No dry mouth or recurrent conjunctivitis. She has not yet met with rheumatology.    She reports chronic fatigue, feeling tired regardless of the amount of sleep she gets. She has a history of hives and skin issues, but no recent eczema. She has had her tonsils removed and has undergone therapy for eye issues in the past, including lack of depth perception and peripheral vision problems.    She is a college student who recently returned home from an older dormitory and is planning to travel to [place] in September for a study program.    Review of Systems    Objective     BP 92/59 (BP Location: Left arm, Patient Position: Sitting, BP Cuff Size: Adult)   Pulse 94   Temp 36.1 °C (97 °F) (Temporal)   Wt 51.8 kg (114 lb 3.2 oz)   LMP 06/11/2025   SpO2 97%   BMI 20.89 kg/m²      Physical Exam  GENERAL: Alert, cooperative, well developed, no acute distress.  HEENT: Normocephalic, normal oropharynx, moist mucous membranes.  NECK: No cervical lymphadenopathy, no thyromegaly, no carotid bruit.  CHEST: Clear to auscultation bilaterally, no wheezes, rhonchi, or crackles.  CARDIOVASCULAR: Regular rate and rhythm, S1 and S2 normal without murmurs.  ABDOMEN: Soft, non-tender, non-distended, without organomegaly, normal bowel sounds.  EXTREMITIES: No cyanosis or edema.  NEUROLOGICAL: Cranial nerves grossly intact, moves all extremities without gross motor or sensory deficit.    Assessment & Plan  Chronic Cough with Sputum Production  Chronic cough with yellow-green sputum, improved by 50% since returning home. Cough is sporadic, with some days of no coughing and others with significant episodes. Unable to produce sufficient sputum for culture. Lungs are clear upon examination. Multiple rounds of antibiotics provided temporary relief, suggesting a possible deep-seated infection. Emphasized the importance of identifying the specific bacteria  causing the infection to target treatment effectively.  - Try nebulizer with albuterol to loosen sputum for culture  - Consider using saline ampules with nebulizer  - Avoid using plain tap water for nebulization  - Consider CT scan of sinuses if sinus pain or pressure worsens  - Refer to ENT for vocal cord evaluation    Recurrent Fever  Low-grade fevers (°F) less consistent since returning home, previously more frequent at college. Cause of fevers is unclear, possibly related to the underlying infection causing the cough.    Immune System Concerns  Immune system issues with low levels of certain streptococcal antibodies, indicating possible immune deficiency. Discussed potential need for further evaluation by a rheumatologist to explore autoimmune conditions.  - Refer to rheumatology for further evaluation    Positive Antinuclear Antibody (ERIC)  Positive ERIC with a nuclear speckled pattern, associated with conditions like Sjogren's syndrome. Discussed possibility of autoimmune conditions and need for further evaluation by a rheumatologist.  - Refer to rheumatology for further evaluation    Tachycardia  Episodes of rapid heartbeat, especially during exercise, with heart rate reaching 172 BPM. Accustomed to fast heart rate and does not always recognize episodes. Previous heart monitor test inconclusive due to limited duration. Advised monitoring symptoms and considering further testing if symptoms worsen. As long as she does not feel lightheaded or faint, the elevated heart rate during exercise is acceptable.  - Schedule tilt table test for August 5th  - Monitor symptoms and report if heart rate causes lightheadedness or fainting    Fatigue  Significant fatigue, possibly related to tachycardia or other underlying conditions. Suggested fatigue could be due to heart working harder than normal.  - Consider full thyroid panel testing  - Refer to rheumatology for further evaluation    Michael Almonte MD     This  medical note was created with the assistance of artificial intelligence (AI) for documentation purposes. The content has been reviewed and confirmed by the healthcare provider for accuracy and completeness. Patient consented to the use of audio recording and use of AI during their visit.

## 2025-06-20 NOTE — PATIENT INSTRUCTIONS
VISIT SUMMARY:  Today, we discussed your persistent cough, fatigue, and other symptoms. Your cough has improved since returning home, but it remains sporadic. We also reviewed your episodes of rapid heartbeat, low-grade fevers, and positive antinuclear antibody test. We have outlined a plan to address these issues and will refer you to specialists as needed.    YOUR PLAN:  -CHRONIC COUGH WITH SPUTUM PRODUCTION: Your chronic cough with yellow-green mucus has improved but remains sporadic. This may be due to a deep-seated infection. We recommend using a nebulizer with albuterol and saline to help loosen the mucus for a culture. Avoid using plain tap water in the nebulizer. If your sinus pain or pressure worsens, a CT scan of your sinuses may be needed. We will also refer you to an ENT specialist to evaluate your vocal cords.    -RECURRENT FEVER: You have been experiencing low-grade fevers, which are less frequent since returning home. These fevers may be related to the underlying infection causing your cough.    -IMMUNE SYSTEM CONCERNS: Your immune system shows low levels of certain antibodies, which may indicate an immune deficiency. We will refer you to a rheumatologist for further evaluation to explore potential autoimmune conditions.    -POSITIVE ANTINUCLEAR ANTIBODY (ERIC): A positive ERIC test can be associated with autoimmune conditions like Sjogren's syndrome. We will refer you to a rheumatologist for further evaluation.    -TACHYCARDIA: You have episodes of rapid heartbeat, especially during exercise. As long as you do not feel lightheaded or faint, this elevated heart rate is acceptable. We have scheduled a tilt table test for August 5th to further evaluate this condition. Please monitor your symptoms and report if you experience lightheadedness or fainting.    -FATIGUE: You are experiencing significant fatigue, which may be related to your rapid heartbeat or other underlying conditions. We will consider a  full thyroid panel test and refer you to a rheumatologist for further evaluation.    INSTRUCTIONS:  Please follow up with the scheduled tilt table test on August 5th. We will also refer you to an ENT specialist and a rheumatologist for further evaluations. Monitor your symptoms, especially your heart rate, and report any episodes of lightheadedness or fainting. If your sinus pain or pressure worsens, a CT scan may be needed.

## 2025-06-23 LAB
ANNOTATION COMMENT IMP: NORMAL
C DIPHTHERIAE AB SER IA-ACNC: 0.74 IU/ML
C TETANI TOXOID AB SER-ACNC: 0.84 IU/ML
FLOW CYTOMETRY SPECIALIST REVIEW: NORMAL
IGA SERPL-MCNC: 80 MG/DL (ref 47–310)
IGG SERPL-MCNC: 955 MG/DL (ref 600–1640)
IGM SERPL-MCNC: 103 MG/DL (ref 50–300)
LPT OKT3 BLD-NRATE: 85.4 %
LPT PHA MAX/CD45 NFR BLD FC: 83.2 %
LPT PW MAX/CD19 NFR BLD FC: 59.7 %
LPT PW/CD3 NFR BLD FC: 20.5 %
LPT PW/CD45 NFR BLD FC: 27.9 %
QUEST FLEXITEST1 RESULTS:: NORMAL
QUEST MANNAN BINDING LECTIN: 799.1 NG/ML
S PN DA SERO 19F IGG SER-MCNC: 2.2 UG/ML
S PNEUM DA 1 IGG SER-MCNC: 0.5 UG/ML
S PNEUM DA 10A IGG SER-MCNC: <0.3 UG/ML
S PNEUM DA 11A IGG SER-MCNC: <0.3 UG/ML
S PNEUM DA 12F IGG SER-MCNC: <0.3 UG/ML
S PNEUM DA 14 IGG SER-MCNC: 14.8
S PNEUM DA 15B IGG SER-MCNC: 2.4 UG/ML
S PNEUM DA 17F IGG SER-MCNC: 1 UG/ML
S PNEUM DA 18C IGG SER-MCNC: 1.7
S PNEUM DA 19A IGG SER-MCNC: <0.3 UG/ML
S PNEUM DA 2 IGG SER-MCNC: <0.3 UG/ML
S PNEUM DA 20A IGG SER-MCNC: 0.5 UG/ML
S PNEUM DA 22F IGG SER-MCNC: 0.4 UG/ML
S PNEUM DA 23F IGG SER-MCNC: 2.6 UG/ML
S PNEUM DA 3 IGG SER-MCNC: <0.3 UG/ML
S PNEUM DA 33F IGG SER-MCNC: <0.3 UG/ML
S PNEUM DA 4 IGG SER-MCNC: 0.7 UG/ML
S PNEUM DA 5 IGG SER-MCNC: 1.3 UG/ML
S PNEUM DA 6B IGG SER-MCNC: 6.7 UG/ML
S PNEUM DA 7F IGG SER-MCNC: <0.3 UG/ML
S PNEUM DA 8 IGG SER-MCNC: 1.2 UG/ML
S PNEUM DA 9N IGG SER-MCNC: 0.6 UG/ML
S PNEUM DA 9V IGG SER-MCNC: 1.3 UG/ML
VIABLE CELLS NFR BLD: 89.9 %

## 2025-06-24 LAB
ANNOTATION COMMENT IMP: ABNORMAL
FLOW CYTOMETRY SPECIALIST REVIEW: ABNORMAL
LPT CA MAX/CD3 BLD FC-NFR: 8.4 %
LPT CA MAX/CD45 BLD FC-NFR: 10.9 %
LPT TT MAX/CD3 BLD FC-NFR: 2.9 %
LPT TT MAX/CD45 BLD FC-NFR: 3.8 %
VIABLE CELLS NFR BLD: 89.9 %

## 2025-07-11 ENCOUNTER — APPOINTMENT (OUTPATIENT)
Dept: CARDIOLOGY | Facility: CLINIC | Age: 20
End: 2025-07-11
Payer: COMMERCIAL

## 2025-07-14 ENCOUNTER — HOSPITAL ENCOUNTER (OUTPATIENT)
Facility: CLINIC | Age: 20
Discharge: HOME | End: 2025-07-14
Payer: COMMERCIAL

## 2025-07-14 DIAGNOSIS — R00.0 SINUS TACHYCARDIA: ICD-10-CM

## 2025-07-14 DIAGNOSIS — R53.83 OTHER FATIGUE: ICD-10-CM

## 2025-07-14 DIAGNOSIS — R55 PRE-SYNCOPE: ICD-10-CM

## 2025-07-14 PROCEDURE — 95923 AUTONOMIC NRV SYST FUNJ TEST: CPT | Performed by: SPECIALIST

## 2025-07-14 PROCEDURE — 95924 ANS PARASYMP & SYMP W/TILT: CPT | Performed by: SPECIALIST

## 2025-07-16 ENCOUNTER — APPOINTMENT (OUTPATIENT)
Dept: CARDIOLOGY | Facility: CLINIC | Age: 20
End: 2025-07-16
Payer: COMMERCIAL

## 2025-07-16 VITALS
HEIGHT: 62 IN | DIASTOLIC BLOOD PRESSURE: 60 MMHG | BODY MASS INDEX: 21.27 KG/M2 | OXYGEN SATURATION: 98 % | WEIGHT: 115.6 LBS | HEART RATE: 89 BPM | SYSTOLIC BLOOD PRESSURE: 98 MMHG

## 2025-07-16 DIAGNOSIS — G90.A POTS (POSTURAL ORTHOSTATIC TACHYCARDIA SYNDROME): Primary | ICD-10-CM

## 2025-07-16 PROCEDURE — 3008F BODY MASS INDEX DOCD: CPT | Performed by: STUDENT IN AN ORGANIZED HEALTH CARE EDUCATION/TRAINING PROGRAM

## 2025-07-16 PROCEDURE — 99213 OFFICE O/P EST LOW 20 MIN: CPT | Performed by: STUDENT IN AN ORGANIZED HEALTH CARE EDUCATION/TRAINING PROGRAM

## 2025-07-16 PROCEDURE — 1036F TOBACCO NON-USER: CPT | Performed by: STUDENT IN AN ORGANIZED HEALTH CARE EDUCATION/TRAINING PROGRAM

## 2025-07-16 NOTE — PROGRESS NOTES
"Chief Complaint:   Palpitations, TACH, and Postural Orthostatic Tachycardia Syndrome (Pots) (Follow up)     History Of Present Illness:    Martha Mejia is a 20 y.o. female returns in follow-up appointment.  Patient underwent testing which included echocardiogram which showed normal ejection fraction no significant valve pathology.  She wore Holter monitor showed average heart rate of 90 bpm no episodes of SVT arrhythmias or VT.  Tilt table testing came back positive for POTS.  Her blood work was unremarkable.  Patient is doing fine otherwise.  She will be learning abroad in [place].     Last Recorded Vitals:  Vitals:    07/16/25 1443   BP: 98/60   BP Location: Left arm   Patient Position: Sitting   BP Cuff Size: Adult   Pulse: 89   SpO2: 98%   Weight: 52.4 kg (115 lb 9.6 oz)   Height: 1.575 m (5' 2\")       Review of Systems  ROS      Allergies:  Gluten, Milk, Lactase, Milk containing products (dairy), and Other    Outpatient Medications:  Current Outpatient Medications   Medication Instructions    albuterol (ProAir RespiClick) 90 mcg/actuation aerosol powdr breath activated inhaler 2 puffs, inhalation, Every 4 hours PRN    albuterol 2.5 mg /3 mL (0.083 %) nebulizer solution INHALE CONTENT OF 1 VIAL UP TO 6 TIMES A DAY AS NEEDED FOR WHEEZING    dicyclomine (BENTYL) 20 mg, 4 times daily before meals and nightly    ondansetron (ZOFRAN) 8 mg, Every 8 hours PRN       Physical Exam:       Last Labs:  CBC -  Lab Results   Component Value Date    WBC 4.7 06/16/2025    WBC 7.2 04/18/2025    HGB 13.8 06/16/2025    HGB 14.5 04/18/2025    HCT 42.5 06/16/2025    HCT 43.4 04/18/2025    MCV 97 06/16/2025    MCV 93.9 04/18/2025     06/16/2025     04/18/2025       CMP -  Lab Results   Component Value Date    CALCIUM 9.4 04/18/2025    PROT 7.5 04/18/2025    ALBUMIN 5.1 04/18/2025    AST 15 04/18/2025    ALT 11 04/18/2025    ALKPHOS 35 04/18/2025    BILITOT 2.6 (H) 04/18/2025       LIPID PANEL -   No results " "found for: \"CHOL\", \"TRIG\", \"HDL\", \"CHHDL\", \"LDLF\", \"VLDL\", \"NHDL\"    RENAL FUNCTION PANEL -   Lab Results   Component Value Date    GLUCOSE 81 04/18/2025     04/18/2025    K 3.9 04/18/2025     04/18/2025    CO2 25 04/18/2025    ANIONGAP 9 04/18/2025    BUN 12 04/18/2025    CREATININE 0.74 04/18/2025    CALCIUM 9.4 04/18/2025    ALBUMIN 5.1 04/18/2025        No results found for: \"BNP\", \"HGBA1C\"    Last Cardiology Tests:  ECG:  Encounter Date: 05/30/25   ECG 12 lead (Clinic Performed)   Result Value    Ventricular Rate 92    Atrial Rate 92    OR Interval 158    QRS Duration 90    QT Interval 334    QTC Calculation(Bazett) 413    P Axis 63    R Axis 77    T Axis 30    QRS Count 15    Q Onset 217    P Onset 138    P Offset 185    T Offset 384    QTC Fredericia 385    Narrative    Normal sinus rhythm  Nonspecific T wave abnormality  Abnormal ECG  When compared with ECG of 01-MAR-2019 14:35,  PREVIOUS ECG IS PRESENT  Confirmed by Juan R Estrella ([id number]) on 6/6/2025 3:36:05 PM        Echo:  No results found for this or any previous visit from the past 1095 days.       Ejection Fractions:  No results found for: \"EF\"    Cath:  CV NCDR CATHPCI V5 COLLECTION FORM       Assessment and Plan    1. POTS: Tilt table testing confirms suspicion for POTS.  The symptoms are consistent.  Suspect resting heart rate is manifestation of this phenomenon.  Previous workup including Holter monitor and echocardiogram are fairly benign.  Resting heart rate is 94 bpm.  Baseline ECG today of normal sinus rhythm with a heart rate of 92 bpm.  - 48-hour Holter monitor negative for arrhythmias with average heart rate of 94 bpm  - Iron studies and thyroid studies were within normal limits  - Advised aggressive hydration and use of salt tablets as tolerated  -Admonished exercise program  -Use of compression hoses as tolerated  -Symptoms are not severe enough to warrant pharmacotherapy.  We can reassess at next appointment    Follow-up in " 6 to 12 months     (This note was generated with voice recognition software and may contain errors including spelling, grammar, syntax and missed recognition of what was dictated, of which may not have been fully corrected)     Juan R Estrella MD PhD

## 2025-08-04 ENCOUNTER — OFFICE VISIT (OUTPATIENT)
Dept: OTOLARYNGOLOGY | Facility: CLINIC | Age: 20
End: 2025-08-04
Payer: COMMERCIAL

## 2025-08-04 VITALS
DIASTOLIC BLOOD PRESSURE: 75 MMHG | HEIGHT: 62 IN | WEIGHT: 114.75 LBS | SYSTOLIC BLOOD PRESSURE: 111 MMHG | TEMPERATURE: 95.9 F | BODY MASS INDEX: 21.12 KG/M2

## 2025-08-04 DIAGNOSIS — R49.8 VOICE FATIGUE: Primary | ICD-10-CM

## 2025-08-04 DIAGNOSIS — R49.9 CHANGE IN VOICE: ICD-10-CM

## 2025-08-04 DIAGNOSIS — J38.2 VOCAL CORD NODULES: ICD-10-CM

## 2025-08-04 DIAGNOSIS — R49.8 OTHER VOICE AND RESONANCE DISORDERS: ICD-10-CM

## 2025-08-04 DIAGNOSIS — R49.0 MUSCLE TENSION DYSPHONIA: ICD-10-CM

## 2025-08-04 DIAGNOSIS — R47.89 BREATHY VOICE QUALITY: ICD-10-CM

## 2025-08-04 PROCEDURE — 1036F TOBACCO NON-USER: CPT | Performed by: OTOLARYNGOLOGY

## 2025-08-04 PROCEDURE — 3008F BODY MASS INDEX DOCD: CPT | Performed by: OTOLARYNGOLOGY

## 2025-08-04 PROCEDURE — 31579 LARYNGOSCOPY TELESCOPIC: CPT | Performed by: OTOLARYNGOLOGY

## 2025-08-04 PROCEDURE — 99204 OFFICE O/P NEW MOD 45 MIN: CPT | Performed by: OTOLARYNGOLOGY

## 2025-08-04 PROCEDURE — 99214 OFFICE O/P EST MOD 30 MIN: CPT | Mod: 25 | Performed by: OTOLARYNGOLOGY

## 2025-08-04 NOTE — PROGRESS NOTES
ASSESSMENT AND PLAN:   Martha Mejia is a 20 y.o. female presenting for increased mucus in the throat.     Today's examination to include stroboscopy demonstrates no significant reflux findings, mild nodular TVC changes most likely due to chronic cough - causes a mildly breathy voice. This is not bothersome for her.     We discussed mucus management and use of Gaviscon and other reflux agents which may help.  No other concerning issues seen on exam or within Hx.     I would like to see the patient back as needed. She will follow up with allergist and rheumatologist.        Assessment & Plan  The patient presents with chronic cough, vocal cord dysfunction, lymphadenopathy, POTS, and recurrent inner ear infections. The patient has a history of POTS and recurrent ear infections. Stroboscopy revealed mild vocal cord edema from coughing, and physical exam showed mild vocal cord swelling causing a mild breathy voice. No concerning masses or lesions were identified.    We discussed options for management to include: mucus management strategies such as Gaviscon or reflux agents, continued follow-up with allergist and rheumatologist, rheumatology evaluation for lymphadenopathy, and consideration of a hearing test if inner ear symptoms persist.    Treatment plan:  - Chronic cough: Provide mucus management strategy sheet and recommend Gaviscon or reflux agents.  - Vocal cord dysfunction: Continued follow-up with allergist and rheumatologist.  - Lymphadenopathy: Rheumatology evaluation scheduled for 08/29/2025.  - POTS: Continue current medications (albuterol, Bentyl, and Zofran).  - Inner ear infection: Consider hearing test if symptoms persist.    Follow-up: Rheumatology evaluation scheduled for 08/29/2025.    PROCEDURE  Pre-Procedure Diagnosis  Discussed risks/benefits, verbal consent obtained.    Procedure Performed  Stroboscopy    Anesthesia  Topical anesthetic spray    Technique  Time-out completed, topical anesthetic  spray applied.    Post-Procedure Diagnosis  Mild vocal cord nodular changes from chronic coughing causing mild breathy voice. No concerning masses or lesions.    Post-Procedure  Patient tolerated procedure well. Provided mucus management strategies, including Gaviscon or reflux agents.      Reason For Consult  No chief complaint on file.         HISTORY OF PRESENT ILLNESS:  Martha Mejia is a 20 y.o. female presenting for an initial visit with me for vocal cord dysfunction and lymphadenopathy of the left neck. She was seen by allergy and noted to have congestion and voice changes. She has a cough due to mucus. She had blood work hat showed positive ERIC. She is scheduled for a visit rheumatologist.      The patient reports that she lived in a dorm and was exposed to mold. She has chronic cough and is expectorating mucus. The cough has improved since moving out the dorm. She had a PFTs and was noted to have a spasm of her larynx.   She has a history of recurrent ear infections.       History of Present Illness  The patient is a new visitor who presented with multiple concerns, including issues with her vocal cords, a chronic cough, and swelling in the left side of her neck. She was referred by Dr. Abigail Moe and had previously seen Dr. Solis for allergies on June 5, 2025. She is scheduled to see a rheumatologist, Dr. Fernández, on August 29, 2025, and plans to travel to [place] on September 4, 2025.    Vocal Cord Issues  - The patient's immunologist performed a breathing test and suggested that spasms in her vocal cords might be causing her cough or affecting her breathing patterns.  - She mentioned that her voice sounds different now compared to previous visits with her immunologist.    Chronic Cough  - The patient has been dealing with congestion, hoarseness, and a cough that produces thick mucus since January 2025.  - She believes exposure to mold in her dormitory worsened her symptoms.  - While her  cough has improved since moving out, it has not resolved.  - She described the cough as thick and productive, with mucus that sometimes feels stuck in her throat and occasionally causes nasal dripping.  - She does not think the cough is coming from her lungs but noted that it worsens when she eats.  - Famotidine for acid reflux has helped, but Mucinex was ineffective.    Inner Ear Infections  - The patient reported having multiple inner ear infections during the last semester, which recurred even after treatment.  - She is currently experiencing pain in one ear and continues to have mucus buildup in her throat.  - She has used nasal sprays in the past but has not been using them consistently.    Medical History and Allergies  - She has a history of postural orthostatic tachycardia syndrome (POTS), palpitations, and allergies to gluten, milk, and lactose.  - Her current medications include albuterol, Bentyl, and Zofran.         Past Medical History  She has a past medical history of Cough, unspecified (03/13/2015), Encounter for immunization (01/25/2019), Encounter for screening for disorder due to exposure to contaminants, Other specified abnormal findings of blood chemistry (09/13/2019), Pain in right arm (09/13/2019), Personal history of other diseases of the female genital tract (12/02/2021), Personal history of other diseases of the female genital tract (12/19/2019), Personal history of other specified conditions (06/11/2019), Personal history of other specified conditions (08/09/2019), Plantar wart (02/16/2018), and Right lower quadrant abdominal tenderness (05/13/2019). Surgical History  She has a past surgical history that includes Tonsillectomy (02/12/2014) and Other surgical history (02/12/2014).   Social History  She reports that she has never smoked. She has never been exposed to tobacco smoke. She has never used smokeless tobacco. She reports that she does not drink alcohol and does not use drugs.  Allergies  Gluten, Milk, Lactase, Milk containing products (dairy), and Other     Family History  Family History[1]     Review of Systems  All 10 systems were reviewed and negative except for above.      Physical Exam    ENT Physical Exam  Constitutional  Appearance: patient appears well-developed and well-nourished,  Head and Face  Appearance: head appears normal and face appears normal;  Ear  Auricles: right auricle normal; left auricle normal;  Nose  External Nose: nares patent bilaterally;  Oral Cavity/Oropharynx  Lips: normal;  Neck  Neck: neck normal; neck palpation normal;  Respiratory  Inspection: no retractions visible;  Cardiovascular  Inspection: no peripheral edema present;  Neurovestibular  Mental Status: alert and oriented;  Psychiatric: mood normal;  Cranial Nerves: cranial nerves intact;     Physical Exam  Ears: Sterile fluid behind right ear.  Oral Cavity: Good palate movement, mild pharyngeal cobblestoning.  Throat: Nodular changes, mild vocal cord edema, thick mucus present.  Stroboscopy: Grade 1 roughness, breathiness 1, normal intelligibility, resonance balanced, vocal loudness and breath support normal. No subglottic edema, granuloma, ventricular obliteration, erythema, or diffuse laryngitis. Mild intra-node thickening, symmetric arytenoids, mild hourglass vocal cord closure due to edema.    Results  - Laboratory Studies:    - Positive Prudence-Barr virus ERIC antibody screening    - Stroboscopy:    - Grade 1 roughness    - Breathiness: 1    - Normal intelligibility    - Resonance: balanced    - Vocal loudness: normal    - Breath support: normal    - No subglottic edema, granuloma, ventricular obliteration, erythema, or diffuse laryngitis    - Mild intra-node thickening    - Symmetric arytenoids    - Mild hourglass vocal cord closure due to vocal cord edema      Last Recorded Vitals  There were no vitals taken for this  visit.    ----------------------------------------------------------------------  Procedures   Flexible Laryngoscopy w/ Videostroboscopy    VOICE AND SPEECH CHARACTERISTICS:  Normal spoken speech, (+) mild dysphonia, no roughness, (+) mild breathiness, no asthenia, no strain.    Intelligibility: normal.   Resonance: balanced.   Vocal Loudness: normal.   Breath Support: normal.    PROCEDURE:    Indications: voice change  PROCEDURE NOTE: FLEXIBLE LARYNGOSCOPY WITH STROBOSCOPY  I recommended a flexible laryngoscopy with stroboscopy based on PE findings, and/or concern for mucosal wave details based upon history and/or for issues associated with hyperreflexic gag on mirror exam concerning for pathology. Risks, benefits, and alternatives were explained. The patient wishes to proceed and gives verbal consent.   Patient is seated in the exam chair. After adequate topical anesthesia, I advance the flexible endoscope. The examination included evaluation of the lemons, vallecula, base of tongue, pyriforms, post-cricoid area, larynx and immediate subglottis.  Findings : assessment of the nasopharynx, base of tongue/vallecula, pyriform sinuses, post-cricoid area and pharyngeal walls was without lesion or mass, pharyngeal wall contraction is normal and symmetric, and no pooling of secretions  thick mucous: 2 (present), IA thickenin (mild), and TVC edema: 1 (mild)  Gross Arytenoid Movement: symmetric.  Arytenoid Height: normal.   Supraglottic Tension: none.  Symmetry: normal.   Amplitude: normal.  Phase Closure: in-phase.  Mucosal Wave Lateral Excursion/Secondary Wave: Bilateral Vocal Cord: no restriction - wave moved more than ½ the width of the vocal fold.  Periodicity: normal.  Closure: hourglass due to mild edema.      Time Spent  Prep time on day of patient encounter: 5-10 minutes  Time spent directly with patient, family or caregiver: 25 minutes  Additional Time Spent on Patient Care Activities/discuss care plan with SLP:  5 minutes  Documentation Time: 10 minutes  Other Time Spent: 0 minutes  Total: 50 minutes     Scribe Attestation  By signing my name below, I, Sneha Aakash , Christina attest that this documentation has been prepared under the direction and in the presence of Hans Cordova MD.      This medical note was created with the assistance of artificial intelligence (AI) for documentation purposes. The content has been reviewed and confirmed by the healthcare provider for accuracy and completeness. Patient consented to the use of audio recording and use of AI during their visit.          [1]   Family History  Problem Relation Name Age of Onset    Asthma Mother      Urticaria Mother      Allergies Mother      Chronic bronchitis Mother      Asthma Father      Eczema Father      Allergies Father      Asthma Sister      Eczema Sister      Allergies Sister      Allergic rhinitis Daughter

## 2025-08-05 ENCOUNTER — APPOINTMENT (OUTPATIENT)
Dept: NEUROLOGY | Facility: HOSPITAL | Age: 20
End: 2025-08-05
Payer: COMMERCIAL

## 2025-08-21 ENCOUNTER — APPOINTMENT (OUTPATIENT)
Dept: ALLERGY | Facility: CLINIC | Age: 20
End: 2025-08-21
Payer: COMMERCIAL

## 2025-08-21 DIAGNOSIS — J45.909 ASTHMA, UNSPECIFIED ASTHMA SEVERITY, UNSPECIFIED WHETHER COMPLICATED, UNSPECIFIED WHETHER PERSISTENT (HHS-HCC): ICD-10-CM

## 2025-08-21 DIAGNOSIS — R05.3 CHRONIC COUGH: Primary | ICD-10-CM

## 2025-08-21 DIAGNOSIS — J32.9 CHRONIC SINUSITIS, UNSPECIFIED LOCATION: ICD-10-CM

## 2025-08-21 PROCEDURE — 90732 PPSV23 VACC 2 YRS+ SUBQ/IM: CPT | Performed by: ALLERGY & IMMUNOLOGY

## 2025-08-21 PROCEDURE — 90471 IMMUNIZATION ADMIN: CPT | Performed by: ALLERGY & IMMUNOLOGY

## 2025-08-21 PROCEDURE — 99214 OFFICE O/P EST MOD 30 MIN: CPT | Performed by: ALLERGY & IMMUNOLOGY

## 2025-08-21 RX ORDER — ALBUTEROL SULFATE 90 UG/1
2 POWDER, METERED RESPIRATORY (INHALATION) EVERY 4 HOURS PRN
Qty: 1 EACH | Refills: 3 | Status: SHIPPED | OUTPATIENT
Start: 2025-08-21 | End: 2025-08-25

## 2025-08-21 RX ORDER — PREDNISONE 20 MG/1
20 TABLET ORAL 2 TIMES DAILY
Qty: 10 TABLET | Refills: 0 | Status: SHIPPED | OUTPATIENT
Start: 2025-08-21 | End: 2025-08-29

## 2025-08-21 RX ORDER — ALBUTEROL SULFATE 0.83 MG/ML
2.5 SOLUTION RESPIRATORY (INHALATION) EVERY 4 HOURS PRN
Qty: 75 ML | Refills: 0 | Status: SHIPPED | OUTPATIENT
Start: 2025-08-21

## 2025-08-21 RX ORDER — AMOXICILLIN 875 MG/1
875 TABLET, COATED ORAL 2 TIMES DAILY
Qty: 20 TABLET | Refills: 0 | Status: SHIPPED | OUTPATIENT
Start: 2025-08-21 | End: 2025-08-31

## 2025-08-22 ENCOUNTER — HOSPITAL ENCOUNTER (OUTPATIENT)
Dept: RADIOLOGY | Facility: CLINIC | Age: 20
Discharge: HOME | End: 2025-08-22
Payer: COMMERCIAL

## 2025-08-22 DIAGNOSIS — J32.9 CHRONIC SINUSITIS, UNSPECIFIED LOCATION: ICD-10-CM

## 2025-08-22 PROCEDURE — 70486 CT MAXILLOFACIAL W/O DYE: CPT

## 2025-08-25 DIAGNOSIS — J45.909 ASTHMA, UNSPECIFIED ASTHMA SEVERITY, UNSPECIFIED WHETHER COMPLICATED, UNSPECIFIED WHETHER PERSISTENT (HHS-HCC): Primary | ICD-10-CM

## 2025-08-25 RX ORDER — ALBUTEROL SULFATE 90 UG/1
2 INHALANT RESPIRATORY (INHALATION) EVERY 6 HOURS PRN
Qty: 18 G | Refills: 0 | Status: SHIPPED | OUTPATIENT
Start: 2025-08-25 | End: 2026-08-25

## 2025-08-29 ENCOUNTER — OFFICE VISIT (OUTPATIENT)
Dept: RHEUMATOLOGY | Facility: CLINIC | Age: 20
End: 2025-08-29
Payer: COMMERCIAL

## 2025-08-29 VITALS
WEIGHT: 115.4 LBS | HEART RATE: 97 BPM | OXYGEN SATURATION: 96 % | DIASTOLIC BLOOD PRESSURE: 65 MMHG | SYSTOLIC BLOOD PRESSURE: 99 MMHG | HEIGHT: 62 IN | BODY MASS INDEX: 21.23 KG/M2

## 2025-08-29 DIAGNOSIS — R30.0 DYSURIA: Primary | ICD-10-CM

## 2025-08-29 PROCEDURE — 99213 OFFICE O/P EST LOW 20 MIN: CPT | Performed by: INTERNAL MEDICINE

## 2025-08-29 PROCEDURE — 3008F BODY MASS INDEX DOCD: CPT | Performed by: INTERNAL MEDICINE

## 2025-08-29 PROCEDURE — 99203 OFFICE O/P NEW LOW 30 MIN: CPT | Performed by: INTERNAL MEDICINE

## 2025-08-29 ASSESSMENT — ENCOUNTER SYMPTOMS
NAUSEA: 1
ABDOMINAL PAIN: 1
ARTHRALGIAS: 1
WHEEZING: 1
HEADACHES: 1
EYE ITCHING: 1
SHORTNESS OF BREATH: 1
FATIGUE: 1
COUGH: 1

## 2025-08-29 ASSESSMENT — PAIN SCALES - GENERAL: PAINLEVEL_OUTOF10: 3

## 2025-08-31 LAB
ALBUMIN/CREAT UR: 5 MG/G CREAT
ANA SER QL IF: NORMAL
ANCA AB SER QL: NORMAL
APPEARANCE UR: CLEAR
B2 GLYCOPROT1 IGA SER-ACNC: NORMAL
B2 GLYCOPROT1 IGG SER-ACNC: NORMAL
B2 GLYCOPROT1 IGM SER-ACNC: NORMAL
BACTERIA #/AREA URNS HPF: ABNORMAL /HPF
BACTERIA UR CULT: ABNORMAL
BACTERIA UR CULT: ABNORMAL
BASOPHILS # BLD AUTO: 70 CELLS/UL (ref 0–200)
BASOPHILS NFR BLD AUTO: 1.3 %
BILIRUB UR QL STRIP: NEGATIVE
C3 SERPL-MCNC: NORMAL MG/DL
C4 SERPL-MCNC: NORMAL MG/DL
CARDIOLIPIN IGA SER IA-ACNC: NORMAL
CARDIOLIPIN IGG SER IA-ACNC: NORMAL
CARDIOLIPIN IGM SER IA-ACNC: NORMAL
COLOR UR: YELLOW
CREAT UR-MCNC: 139 MG/DL (ref 20–275)
EOSINOPHIL # BLD AUTO: 108 CELLS/UL (ref 15–500)
EOSINOPHIL NFR BLD AUTO: 2 %
ERYTHROCYTE [DISTWIDTH] IN BLOOD BY AUTOMATED COUNT: 11.3 % (ref 11–15)
GLUCOSE UR QL STRIP: NEGATIVE
HCT VFR BLD AUTO: 43.6 % (ref 35–45)
HGB BLD-MCNC: 14.4 G/DL (ref 11.7–15.5)
HGB UR QL STRIP: NEGATIVE
HYALINE CASTS #/AREA URNS LPF: ABNORMAL /LPF
KETONES UR QL STRIP: NEGATIVE
LA 2 SCREEN W REFLEX-IMP: NORMAL
LEUKOCYTE ESTERASE UR QL STRIP: ABNORMAL
LYMPHOCYTES # BLD AUTO: 1517 CELLS/UL (ref 850–3900)
LYMPHOCYTES NFR BLD AUTO: 28.1 %
MCH RBC QN AUTO: 31.2 PG (ref 27–33)
MCHC RBC AUTO-ENTMCNC: 33 G/DL (ref 32–36)
MCV RBC AUTO: 94.6 FL (ref 80–100)
MICROALBUMIN UR-MCNC: 0.7 MG/DL
MONOCYTES # BLD AUTO: 292 CELLS/UL (ref 200–950)
MONOCYTES NFR BLD AUTO: 5.4 %
MYELOPEROXIDASE AB SER IA-ACNC: NORMAL
NEUTROPHILS # BLD AUTO: 3413 CELLS/UL (ref 1500–7800)
NEUTROPHILS NFR BLD AUTO: 63.2 %
NITRITE UR QL STRIP: NEGATIVE
PH UR STRIP: 6.5 [PH] (ref 5–8)
PLATELET # BLD AUTO: 239 THOUSAND/UL (ref 140–400)
PMV BLD REES-ECKER: 9.6 FL (ref 7.5–12.5)
PROT UR QL STRIP: NEGATIVE
PROTEINASE3 AB SER IA-ACNC: NORMAL
RBC # BLD AUTO: 4.61 MILLION/UL (ref 3.8–5.1)
RBC #/AREA URNS HPF: ABNORMAL /HPF
SCREEN APTT: NORMAL S
SCREEN DRVVT: NORMAL S
SERVICE CMNT-IMP: ABNORMAL
SP GR UR STRIP: 1.02 (ref 1–1.03)
SQUAMOUS #/AREA URNS HPF: ABNORMAL /HPF
THYROPEROXIDASE AB SERPL-ACNC: NORMAL [IU]/ML
WBC # BLD AUTO: 5.4 THOUSAND/UL (ref 3.8–10.8)
WBC #/AREA URNS HPF: ABNORMAL /HPF

## 2025-09-02 ENCOUNTER — APPOINTMENT (OUTPATIENT)
Dept: CARDIOLOGY | Facility: CLINIC | Age: 20
End: 2025-09-02
Payer: COMMERCIAL

## 2025-09-03 LAB
ALBUMIN/CREAT UR: 5 MG/G CREAT
ANA PAT SER IF-IMP: ABNORMAL
ANA PAT SER IF-IMP: ABNORMAL
ANA SER QL IF: POSITIVE
ANA TITR SER IF: ABNORMAL TITER
ANA TITR SER IF: ABNORMAL TITER
ANCA AB SER QL: NEGATIVE
APPEARANCE UR: CLEAR
B2 GLYCOPROT1 IGA SER-ACNC: <2 U/ML
B2 GLYCOPROT1 IGG SER-ACNC: <2 U/ML
B2 GLYCOPROT1 IGM SER-ACNC: <2 U/ML
BACTERIA #/AREA URNS HPF: ABNORMAL /HPF
BACTERIA UR CULT: ABNORMAL
BACTERIA UR CULT: ABNORMAL
BASOPHILS # BLD AUTO: 70 CELLS/UL (ref 0–200)
BASOPHILS NFR BLD AUTO: 1.3 %
BILIRUB UR QL STRIP: NEGATIVE
C3 SERPL-MCNC: 128 MG/DL (ref 83–193)
C4 SERPL-MCNC: 22 MG/DL (ref 15–57)
CARDIOLIPIN IGA SER IA-ACNC: <2 APL-U/ML
CARDIOLIPIN IGG SER IA-ACNC: <2 GPL-U/ML
CARDIOLIPIN IGM SER IA-ACNC: <2 MPL-U/ML
CENTROMERE B AB SER-ACNC: ABNORMAL AI
COLOR UR: YELLOW
CREAT UR-MCNC: 139 MG/DL (ref 20–275)
DSDNA AB SER-ACNC: <1 IU/ML
ENA JO1 AB SER IA-ACNC: ABNORMAL AI
ENA RNP AB SER-ACNC: ABNORMAL AI
ENA SCL70 AB SER IA-ACNC: ABNORMAL AI
ENA SM AB SER IA-ACNC: ABNORMAL AI
ENA SM+RNP AB SER IA-ACNC: ABNORMAL AI
ENA SS-A AB SER IA-ACNC: ABNORMAL AI
ENA SS-B AB SER IA-ACNC: ABNORMAL AI
EOSINOPHIL # BLD AUTO: 108 CELLS/UL (ref 15–500)
EOSINOPHIL NFR BLD AUTO: 2 %
ERYTHROCYTE [DISTWIDTH] IN BLOOD BY AUTOMATED COUNT: 11.3 % (ref 11–15)
GLUCOSE UR QL STRIP: NEGATIVE
HCT VFR BLD AUTO: 43.6 % (ref 35–45)
HGB BLD-MCNC: 14.4 G/DL (ref 11.7–15.5)
HGB UR QL STRIP: NEGATIVE
HYALINE CASTS #/AREA URNS LPF: ABNORMAL /LPF
KETONES UR QL STRIP: NEGATIVE
LA 2 SCREEN W REFLEX-IMP: NORMAL
LABORATORY COMMENT REPORT: ABNORMAL
LEUKOCYTE ESTERASE UR QL STRIP: ABNORMAL
LYMPHOCYTES # BLD AUTO: 1517 CELLS/UL (ref 850–3900)
LYMPHOCYTES NFR BLD AUTO: 28.1 %
MCH RBC QN AUTO: 31.2 PG (ref 27–33)
MCHC RBC AUTO-ENTMCNC: 33 G/DL (ref 32–36)
MCV RBC AUTO: 94.6 FL (ref 80–100)
MICROALBUMIN UR-MCNC: 0.7 MG/DL
MONOCYTES # BLD AUTO: 292 CELLS/UL (ref 200–950)
MONOCYTES NFR BLD AUTO: 5.4 %
MYELOPEROXIDASE AB SER IA-ACNC: <1 AI
NEUTROPHILS # BLD AUTO: 3413 CELLS/UL (ref 1500–7800)
NEUTROPHILS NFR BLD AUTO: 63.2 %
NITRITE UR QL STRIP: NEGATIVE
NUCLEOSOME AB SER IA-ACNC: ABNORMAL AI
PH UR STRIP: 6.5 [PH] (ref 5–8)
PLATELET # BLD AUTO: 239 THOUSAND/UL (ref 140–400)
PMV BLD REES-ECKER: 9.6 FL (ref 7.5–12.5)
PROT UR QL STRIP: NEGATIVE
PROTEINASE3 AB SER IA-ACNC: <1 AI
RBC # BLD AUTO: 4.61 MILLION/UL (ref 3.8–5.1)
RBC #/AREA URNS HPF: ABNORMAL /HPF
RIBOSOMAL P AB SER-ACNC: ABNORMAL AI
SCREEN APTT: 35 SEC
SCREEN DRVVT: 36 SEC
SERVICE CMNT-IMP: ABNORMAL
SP GR UR STRIP: 1.02 (ref 1–1.03)
SQUAMOUS #/AREA URNS HPF: ABNORMAL /HPF
THYROPEROXIDASE AB SERPL-ACNC: <1 IU/ML
WBC # BLD AUTO: 5.4 THOUSAND/UL (ref 3.8–10.8)
WBC #/AREA URNS HPF: ABNORMAL /HPF

## 2026-01-05 ENCOUNTER — APPOINTMENT (OUTPATIENT)
Dept: PRIMARY CARE | Facility: CLINIC | Age: 21
End: 2026-01-05
Payer: COMMERCIAL

## 2026-01-22 ENCOUNTER — APPOINTMENT (OUTPATIENT)
Dept: ALLERGY | Facility: CLINIC | Age: 21
End: 2026-01-22
Payer: COMMERCIAL